# Patient Record
Sex: FEMALE | Race: WHITE | NOT HISPANIC OR LATINO | Employment: FULL TIME | URBAN - NONMETROPOLITAN AREA
[De-identification: names, ages, dates, MRNs, and addresses within clinical notes are randomized per-mention and may not be internally consistent; named-entity substitution may affect disease eponyms.]

---

## 2022-07-15 ENCOUNTER — OFFICE VISIT (OUTPATIENT)
Dept: FAMILY MEDICINE | Facility: OTHER | Age: 62
End: 2022-07-15
Attending: NURSE PRACTITIONER
Payer: COMMERCIAL

## 2022-07-15 ENCOUNTER — HOSPITAL ENCOUNTER (OUTPATIENT)
Dept: GENERAL RADIOLOGY | Facility: OTHER | Age: 62
Discharge: HOME | End: 2022-07-15
Attending: NURSE PRACTITIONER
Payer: COMMERCIAL

## 2022-07-15 VITALS
TEMPERATURE: 97.7 F | RESPIRATION RATE: 16 BRPM | OXYGEN SATURATION: 99 % | HEIGHT: 61 IN | HEART RATE: 64 BPM | BODY MASS INDEX: 22.47 KG/M2 | DIASTOLIC BLOOD PRESSURE: 64 MMHG | SYSTOLIC BLOOD PRESSURE: 124 MMHG | WEIGHT: 119 LBS

## 2022-07-15 DIAGNOSIS — S99.922A FOOT INJURY, LEFT, INITIAL ENCOUNTER: Primary | ICD-10-CM

## 2022-07-15 DIAGNOSIS — M79.672 LEFT FOOT PAIN: ICD-10-CM

## 2022-07-15 DIAGNOSIS — S92.515A CLOSED NONDISPLACED FRACTURE OF PROXIMAL PHALANX OF LESSER TOE OF LEFT FOOT, INITIAL ENCOUNTER: ICD-10-CM

## 2022-07-15 PROCEDURE — 73630 X-RAY EXAM OF FOOT: CPT | Mod: LT

## 2022-07-15 PROCEDURE — 99203 OFFICE O/P NEW LOW 30 MIN: CPT | Performed by: NURSE PRACTITIONER

## 2022-07-15 RX ORDER — MONTELUKAST SODIUM 10 MG/1
10 TABLET ORAL AT BEDTIME
COMMUNITY

## 2022-07-15 RX ORDER — CETIRIZINE HYDROCHLORIDE 10 MG/1
10 TABLET ORAL DAILY
COMMUNITY

## 2022-07-15 ASSESSMENT — PAIN SCALES - GENERAL: PAINLEVEL: NO PAIN (1)

## 2022-07-15 NOTE — NURSING NOTE
Patient presents to clinic today for left foot pain, swelling, bruising after a small injury last night.    Medication reconciliation completed.    ACP on file? In process    FOOD SECURITY SCREENING QUESTIONS    The next two questions are to help us understand your food security.  If you are feeling you need any assistance in this area, we have resources available to support you today.    Hunger Vital Signs:  Within the past 12 months we worried whether our food would run out before we got money to buy more. Never  Within the past 12 months the food we bought just didn't last and we didn't have money to get more. Never    Lupe Combs CMA([facility])..................7/15/2022   11:07 AM

## 2022-07-15 NOTE — PATIENT INSTRUCTIONS
Today's xr showed a nondisplaced proximal phalanx fracture small toe. The remainder of the foot is without fracture.   Recommend sarmad taping - as done in office today to help prevent displacement/shifting of the fx and promote healing   Surgical shoe may also be helpful.

## 2022-07-15 NOTE — PROGRESS NOTES
ASSESSMENT/PLAN:    I have reviewed the nursing notes.  I have reviewed the findings, diagnosis, plan and need for follow up with the patient.    1. Foot injury, left, initial encounter  2. Left foot pain  - XR Foot Left G/E 3 Views    3. Closed nondisplaced fracture of proximal phalanx of lesser toe of left foot, initial encounter  - Miscellaneous Order for DME - ONLY FOR DME  - May use over-the-counter Tylenol or ibuprofen PRN  I reviewed x-ray in the office today with Cecilia which revealed closed nondisplaced fracture of the proximal feelings of fifth toe of left foot.  This is clinically consistent with exam findings and mechanism of injury.  I sarmad taped the fourth and fifth toes together and provided a surgical shoe and additional information to patient regarding nondisplaced fracture.  I do not feel Ortho referral is necessary at this time, but recommend she follow-up with her primary in 2 to 4 weeks potentially for repeat x-rays.  Recommend ice, rest, elevation, avoid overuse during the healing time.    Follow up if symptoms persist or worsen or concerns    I explained my diagnostic considerations and recommendations to the patient, who voiced understanding and agreement with the treatment plan. All questions were answered. We discussed potential side effects of any prescribed or recommended therapies, as well as expectations for response to treatments.    Jennifer Unger NP  7/15/2022  11:14 AM    HPI:  Susannah Richards is a 61 year old female who presents to [facility] today for concerns of left foot pain, swelling, bruising after an injury last night.  She reports that she slipped on some rocks and her little toe bent outward.  She is having bruising to the fifth and fourth digits as well as the top of her foot with very minimal tenderness.  She does have a history of having had a hairline fracture in the past and was not treated properly, thus complications presented.  Would like to rule out  "fracture today which seems reasonable.  Ibuprofen did help last night the pain did not wake her up at night.      History reviewed. No pertinent past medical history.  History reviewed. No pertinent surgical history.  Social History     Tobacco Use     Smoking status: Never Smoker     Smokeless tobacco: Never Used   Substance Use Topics     Alcohol use: Yes     Alcohol/week: 2.0 standard drinks     Types: 2 Glasses of wine per week     Current Outpatient Medications   Medication Sig Dispense Refill     cetirizine (ZYRTEC) 10 MG tablet Take 10 mg by mouth daily       montelukast (SINGULAIR) 10 MG tablet Take 10 mg by mouth At Bedtime       Allergies   Allergen Reactions     Shrimp Other (See Comments)     Allergy to shrimp and shellfish-unknown reaction/not listed.      Past medical history, past surgical history, current medications and allergies reviewed and accurate to the best of my knowledge.      ROS:  Refer to HPI    /64 (BP Location: Right arm, Patient Position: Sitting, Cuff Size: Adult Regular)   Pulse 64   Temp 97.7  F (36.5  C) (Tympanic)   Resp 16   Ht 1.549 m (5' 1\")   Wt 54 kg (119 lb)   SpO2 99%   Breastfeeding No   BMI 22.48 kg/m      EXAM:  General Appearance: Well appearing 61 year old female, appropriate appearance for age. No acute distress   Respiratory: No increased work of breathing.  No cough appreciated.  Musculoskeletal:    Left FOOT PHYSICAL EXAMINATION:  Inspection: slight edema, ecchymosis to dorsal surface of lateral half of foot, most significant on 5th toe, no bony deformity or skin abnormalities are noted.      Palpation: Tenderness to palpation over NO ANKLE tenderness. Slight tenderness over 5th metatarsal and phalanx.     ROM: plantarflexion full, dorsiflexion full, inversion full, eversion full.     Neurovascular Exam:   Pulses: Dorsalis pedis and Posterior tibial pulse 2+   Capillary refill intact < 3 seconds   Sensation intact, patient able to wiggle/move all " toes    Psychological: normal affect, alert, oriented, and pleasant.     Results for orders placed or performed in visit on 07/15/22   XR Foot Left G/E 3 Views     Status: None    Narrative    PROCEDURE: XR FOOT LEFT G/E 3 VIEWS 7/15/2022 11:41 AM    HISTORY: r/o fx, foot injury last night. significant bruising to  lateral half of foot and 4th and 5th digits.; Foot injury, left,  initial encounter; Left foot pain    COMPARISONS: None.    TECHNIQUE: 3 views.    FINDINGS: There is an obliquely oriented nondisplaced fracture through  the proximal phalanx of the small toe.    No other acute fracture or dislocation is seen. There is no focal bone  lesion. There is no significant degenerative change.         Impression    IMPRESSION: Nondisplaced proximal phalanx fracture small toe.    OSCAR UMANA MD         SYSTEM ID:  [id number]